# Patient Record
Sex: FEMALE | Race: WHITE
[De-identification: names, ages, dates, MRNs, and addresses within clinical notes are randomized per-mention and may not be internally consistent; named-entity substitution may affect disease eponyms.]

---

## 2017-10-09 ENCOUNTER — HOSPITAL ENCOUNTER (OUTPATIENT)
Dept: HOSPITAL 47 - RADMAMWWP | Age: 57
Discharge: HOME | End: 2017-10-09
Attending: FAMILY MEDICINE
Payer: COMMERCIAL

## 2017-10-09 DIAGNOSIS — Z12.31: Primary | ICD-10-CM

## 2017-10-10 NOTE — MM
Reason for exam: screening  (asymptomatic).

Last mammogram was performed 1 year and 2 months ago.



History:

Patient is postmenopausal, has history of other cancer at age 49, and is 

nulliparous.

Family history of breast cancer in paternal aunt at age 40.

Benign left mammotome panel of the left breast, April 9, 2013.



Physical Findings:

A clinical breast exam by your physician is recommended on an annual basis and 

results should be correlated with mammographic findings.



MG Screening Mammo w CAD

Bilateral CC and MLO view(s) were taken.

Prior study comparison: August 24, 2016, bilateral MG screening mammo w CAD.  

August 20, 2015, bilateral MG screening mammo w CAD.

There are scattered fibroglandular densities.

Finding: There are typically benign round, regional calcifications in both 

breasts. Previous mammotome biopsy in the left breast. Developing nodularity in 

the left breast upper outer quadrant anterior position.





ASSESSMENT: Incomplete: need additional imaging evaluation, BI-RAD 0



RECOMMENDATION:

Special view mammogram and ultrasound of the left breast.



[facility] will attempt to contact patient to return for supplemental 

views and ultrasound.

## 2017-10-20 ENCOUNTER — HOSPITAL ENCOUNTER (OUTPATIENT)
Dept: HOSPITAL 47 - RADMAMWWP | Age: 57
Discharge: HOME | End: 2017-10-20
Attending: FAMILY MEDICINE
Payer: COMMERCIAL

## 2017-10-20 DIAGNOSIS — R92.8: Primary | ICD-10-CM

## 2017-10-20 PROCEDURE — 76642 ULTRASOUND BREAST LIMITED: CPT

## 2017-10-20 NOTE — MM
Reason for exam: additional evaluation requested from abnormal screening.

Last mammogram was performed less than 1 month ago.



History:

Patient is postmenopausal, has history of other cancer at age 49, and is 

nulliparous.

Family history of breast cancer in paternal aunt at age 40.

Benign left mammotome panel of the left breast, April 9, 2013.



Physical Findings:

Nurse did not find any significant physical abnormalities on exam.



MG Work Up Mamm w CAD LT

Spot compression CC, spot compression MLO, and LM view(s) were taken of the left 

breast.

Prior study comparison: October 9, 2017, bilateral MG screening mammo w CAD.  

August 24, 2016, bilateral MG screening mammo w CAD.

The breast tissue is heterogeneously dense. This may lower the sensitivity of 

mammography.

Finding: There are typically benign calcifications in the left breast. Previous 

mammotome biopsy in the left breast. There is a chronic nodularity in the left 

breast, stable from 2015.





ASSESSMENT: Probably benign, BI-RAD 3



RECOMMENDATION:

Follow-up diagnostic mammogram of the left breast in 6 months.

## 2017-10-20 NOTE — USB
Reason for exam: additional evaluation requested from abnormal screening.



History:

Patient is postmenopausal, has history of other cancer at age 49, and is 

nulliparous.

Family history of breast cancer in paternal aunt at age 40.

Benign left mammotome panel of the left breast, April 9, 2013.



US Breast Workup Limited LT

Left breast ultrasound demonstrates a 0.7 x 0.7 x 0.2cm cystic lesion with 

septation at 6 o'clock.



These results were verbally communicated with the patient and result sheet given 

to the patient on 10/20/17.





ASSESSMENT: Probably benign, BI-RAD 3



RECOMMENDATION:

Follow-up diagnostic mammogram of the left breast in 6 months.

## 2018-05-14 ENCOUNTER — HOSPITAL ENCOUNTER (OUTPATIENT)
Dept: HOSPITAL 47 - RADMAMWWP | Age: 58
Discharge: HOME | End: 2018-05-14
Attending: FAMILY MEDICINE
Payer: COMMERCIAL

## 2018-05-14 DIAGNOSIS — N63.20: Primary | ICD-10-CM

## 2018-05-14 PROCEDURE — 77067 SCR MAMMO BI INCL CAD: CPT

## 2018-05-15 NOTE — MM
Reason for exam: follow-up at short interval from prior study.

Last mammogram was performed 7 months ago.



History:

Patient is postmenopausal, has history of other cancer at age 49, and is 

nulliparous.

Family history of breast cancer in paternal aunt at age 40.

Benign left mammotome panel of the left breast, April 9, 2013.



Physical Findings:

Nurse did not find any significant physical abnormalities on exam.



MG Diagnostic Mammo LT w CAD

CC and MLO view(s) were taken of the left breast.

Prior study comparison: October 20, 2017, left breast MG work up mamm w CAD LT.  

October 9, 2017, bilateral MG screening mammo w CAD.

The breast tissue is heterogeneously dense. This may lower the sensitivity of 

mammography.  No significant new findings when compared with previous films.



These results were verbally communicated with the patient and result sheet given 

to the patient on 5/14/18.





ASSESSMENT: Benign, BI-RAD 2



RECOMMENDATION:

Return to routine screening mammogram schedule for both breasts.

Back on schedule for October 2018.

Manage patient on a clinical basis.

## 2019-01-10 ENCOUNTER — HOSPITAL ENCOUNTER (OUTPATIENT)
Dept: HOSPITAL 47 - RADMAMWWP | Age: 59
Discharge: HOME | End: 2019-01-10
Attending: FAMILY MEDICINE
Payer: COMMERCIAL

## 2019-01-10 DIAGNOSIS — Z12.31: Primary | ICD-10-CM

## 2019-01-10 PROCEDURE — 77067 SCR MAMMO BI INCL CAD: CPT

## 2019-01-10 PROCEDURE — 77063 BREAST TOMOSYNTHESIS BI: CPT

## 2019-01-13 NOTE — MM
Reason for exam: screening  (asymptomatic).

Last mammogram was performed 8 months ago.



History:

Patient is postmenopausal, has history of other cancer at age 49, and is 

nulliparous.

Family history of breast cancer in paternal aunt at age 40.

Benign left mammotome panel of the left breast, April 9, 2013.



MG 3D Screening Mammo W/Cad

Bilateral CC and MLO view(s) were taken.

Prior study comparison: May 14, 2018, left breast MG diagnostic mammo LT w CAD.  

October 20, 2017, left breast MG work up mamm w CAD LT.

The breast tissue is heterogeneously dense. This may lower the sensitivity of 

mammography.

There are benign-appearing bilateral breast calcifications.  Left biopsy marker is

noted. No suspicious abnormality.  No significant changes when compared with 

prior studies.





ASSESSMENT: Benign, BI-RAD 2



RECOMMENDATION:

Routine screening mammogram of both breasts in 1 year.

## 2020-02-25 ENCOUNTER — HOSPITAL ENCOUNTER (OUTPATIENT)
Dept: HOSPITAL 47 - RADMAMWWP | Age: 60
Discharge: HOME | End: 2020-02-25
Attending: FAMILY MEDICINE
Payer: COMMERCIAL

## 2020-02-25 DIAGNOSIS — Z12.31: Primary | ICD-10-CM

## 2020-02-25 PROCEDURE — 77063 BREAST TOMOSYNTHESIS BI: CPT

## 2020-02-25 PROCEDURE — 77067 SCR MAMMO BI INCL CAD: CPT

## 2020-02-27 NOTE — MM
Reason for exam: screening  (asymptomatic).

Last mammogram was performed 1 year and 1 month ago.



History:

Patient is postmenopausal, has history of other cancer at age 49, and is 

nulliparous.

Family history of breast cancer in paternal aunt at age 40.

Benign left mammotome panel of the left breast, April 9, 2013.



Physical Findings:

A clinical breast exam by your physician is recommended on an annual basis and 

results should be correlated with mammographic findings.



MG 3D Screening Mammo W/Cad

Bilateral CC and MLO view(s) were taken.  XCCL view(s) were taken of the right 

breast.

Prior study comparison: January 10, 2019, bilateral MG 3d screening mammo w/cad.  

May 14, 2018, left breast MG diagnostic mammo LT w CAD.

The breast tissue is heterogeneously dense. This may lower the sensitivity of 

mammography.  Focal asymmetry increased distortion right upper outer quadrant 

middle breast.

This finding is changed when compared with previous exams.





ASSESSMENT: Incomplete: need additional imaging evaluation, BI-RAD 0



RECOMMENDATION:

Special view mammogram of the right breast.



If lesion persists on supplemental views, image directed ultrasound is 

recommended.



[facility] will attempt to contact patient to return for supplemental 

views and ultrasound if indicated.

## 2020-03-06 ENCOUNTER — HOSPITAL ENCOUNTER (OUTPATIENT)
Dept: HOSPITAL 47 - RADMAMWWP | Age: 60
Discharge: HOME | End: 2020-03-06
Attending: FAMILY MEDICINE
Payer: COMMERCIAL

## 2020-03-06 DIAGNOSIS — R92.8: Primary | ICD-10-CM

## 2020-03-06 PROCEDURE — 77061 BREAST TOMOSYNTHESIS UNI: CPT

## 2020-03-06 PROCEDURE — 77065 DX MAMMO INCL CAD UNI: CPT

## 2020-03-09 NOTE — MM
Reason for exam: additional evaluation requested from abnormal screening.

Last mammogram was performed less than 1 month ago.



History:

Patient is postmenopausal, has history of other cancer at age 49, and is 

nulliparous.

Family history of breast cancer in paternal aunt at age 40.

Benign left mammotome panel of the left breast, April 9, 2013.



Physical Findings:

Nurse did not find any significant physical abnormalities on exam.



MG 3D Work Up W/Cad RT

Spot compression CC, spot compression MLO, and LM view(s) were taken of the right 

breast.

Prior study comparison: February 25, 2020, bilateral MG 3d screening mammo w/cad. 

January 10, 2019, bilateral MG 3d screening mammo w/cad.

The breast tissue is heterogeneously dense. This may lower the sensitivity of 

mammography.  Benign appearing calcifications in the right breast. The previously 

seen abnormality resolves on additional views and appears as fibroglandular tissue

compatible with summation. No suspicious abnormality. Focal asymmetry is stable 

compared to priors.



These results were verbally communicated with the patient and result sheet given 

to the patient on 3/6/20.





ASSESSMENT: Benign, BI-RAD 2



RECOMMENDATION:

Follow-up diagnostic mammogram of both breasts in 1 year.